# Patient Record
Sex: FEMALE | Race: ASIAN | NOT HISPANIC OR LATINO | URBAN - METROPOLITAN AREA
[De-identification: names, ages, dates, MRNs, and addresses within clinical notes are randomized per-mention and may not be internally consistent; named-entity substitution may affect disease eponyms.]

---

## 2022-06-27 ENCOUNTER — OFFICE VISIT (OUTPATIENT)
Dept: DENTISTRY | Facility: CLINIC | Age: 22
End: 2022-06-27

## 2022-06-27 VITALS — HEART RATE: 84 BPM | DIASTOLIC BLOOD PRESSURE: 77 MMHG | TEMPERATURE: 98.4 F | SYSTOLIC BLOOD PRESSURE: 115 MMHG

## 2022-06-27 DIAGNOSIS — Z01.20 ENCOUNTER FOR DENTAL EXAMINATION: Primary | ICD-10-CM

## 2022-06-27 PROCEDURE — D0120 PERIODIC ORAL EVALUATION - ESTABLISHED PATIENT: HCPCS | Performed by: DENTIST

## 2022-06-27 PROCEDURE — D0274 BITEWINGS - 4 RADIOGRAPHIC IMAGES: HCPCS

## 2022-06-27 PROCEDURE — D1110 PROPHYLAXIS - ADULT: HCPCS

## 2022-06-27 RX ORDER — IBUPROFEN 200 MG
200 TABLET ORAL EVERY 6 HOURS PRN
COMMUNITY

## 2022-06-27 RX ORDER — SODIUM FLUORIDE 5 MG/ML
PASTE, DENTIFRICE DENTAL DAILY
Qty: 51 G | Refills: 2 | Status: SHIPPED | OUTPATIENT
Start: 2022-06-27

## 2022-06-27 NOTE — PROGRESS NOTES
RECALL EXAM, ADULT PROPHY,  4 BWX    REVIEWED MED HX: meds, allergies, health changes reviewed in EPIC  CHIEF CONCERN:  none   PAIN SCALE:  0  ASA CLASS:  I  PLAQUE:  mild   CALCULUS:  llight calculus  BLEEDING:   none   STAIN :   None  ORAL HYGIENE:  Good  PERIO:   Its been 3yr since last cleaning ( Covid) reviewed OH and updated BW's  Hand scaled, polished and flossed  Patient had 3rd molars extracted   Dr Kelsey Carcamo prescribed prevident, and gave instructions     Oral Hygiene Instruction:  recommended brushing 2 x daily for 2 minutes MIN, recommended flossing daily, reviewed dietary precautions  Visual and Tactile Intraoral/ Extraoral evaluation: Oral and Oropharyngeal cancer evaluation  No findings     Dr Kelsey Carcamo  exam=   Reviewed with patient clinical and radiographic findings and patient verbalized understanding  All questions and concerns addressed       REFERRALS: no referrals needed    CARIES FINDINGS:  caries noted #12 DO, #13 MOD, #14 B, #18 B #19 B  #4 MO, #30 Occlusal Preventive resin restoration,    Watch #20 DO shadow on both bw films, #5 watch mesial     Next Visit: Restorative #12 DO, #13 MOD #14 B     Next Recall: 6 month recall     Last BWX: today  6/27/2022  Last Panorex:  6/5/2019

## 2022-10-05 ENCOUNTER — OFFICE VISIT (OUTPATIENT)
Dept: DENTISTRY | Facility: CLINIC | Age: 22
End: 2022-10-05

## 2022-10-05 VITALS — DIASTOLIC BLOOD PRESSURE: 75 MMHG | HEART RATE: 101 BPM | SYSTOLIC BLOOD PRESSURE: 109 MMHG | TEMPERATURE: 99.6 F

## 2022-10-05 DIAGNOSIS — K02.9 TOOTH DECAY: Primary | ICD-10-CM

## 2022-10-05 PROCEDURE — D2391 RESIN-BASED COMPOSITE - 1 SURFACE, POSTERIOR: HCPCS | Performed by: DENTIST

## 2022-10-05 PROCEDURE — D2392 RESIN-BASED COMPOSITE - 2 SURFACES, POSTERIOR: HCPCS | Performed by: DENTIST

## 2022-10-05 NOTE — PROGRESS NOTES
Composite Filling    Keshia Coppola presents for composite filling  PMH reviewed, no changes  ASA I    Discussed with patient need for RCT if pulp exposure occurs or in future if pulp is inflamed  Pt understands and consents  Applied topical benzocaine, administered 1 carps 2% lido 1:100k epi via IANB LR side    Prepped tooth # 30(OB), 31(O) with 245 carbide on high speed  Prep mod #30(O)  Deep, placed limelite, cured   Isolation with cotton rolls and dri-angles    Etch with 37% H2PO4, rinse, dry  Applied Adhese with 20 second scrub once, gentle air dry and light cured for 10s  Restored with Tetric bulk michelle shade A3 and light cured  Refined with finishing burs, polished with enhance point  Verified occlusion and contacts  POI given   Pt also adv desens  toothpaste incase there is sensitivity  Pt left comfortable and satisfied     nv: fills (UR side)

## 2023-01-09 ENCOUNTER — OFFICE VISIT (OUTPATIENT)
Dept: DENTISTRY | Facility: CLINIC | Age: 23
End: 2023-01-09

## 2023-01-09 VITALS — TEMPERATURE: 99.6 F

## 2023-01-09 DIAGNOSIS — Z01.20 ENCOUNTER FOR DENTAL EXAMINATION: Primary | ICD-10-CM

## 2023-03-31 ENCOUNTER — OFFICE VISIT (OUTPATIENT)
Dept: DENTISTRY | Facility: CLINIC | Age: 23
End: 2023-03-31

## 2023-03-31 VITALS — SYSTOLIC BLOOD PRESSURE: 112 MMHG | HEART RATE: 88 BPM | TEMPERATURE: 98.7 F | DIASTOLIC BLOOD PRESSURE: 76 MMHG

## 2023-03-31 DIAGNOSIS — K02.9 CARIES: Primary | ICD-10-CM

## 2023-03-31 NOTE — PROGRESS NOTES
"Composite Filling: #13MOD and 14-DO  Name pronounced \"Tat\"  Child Sea presents for #13MOD and #14-DO  composite filling  PMH reviewed, no changes  Applied topical benzocaine, administered 1 carps 2% lido 1:100k epi via local infiltration  Prepped tooth #13 MO and DO slot preps with 245 bur and did not connect preps  Clinical cavitation on 14-M was visualized  Prepped 14MO with 245 carbide on high speed  Caries removed with round carbide on slow speed  Placed tofflemire on #13 and palodent matrix on 14 with wedge in between  Etched #13MOD with 37% H2PO4, rinsed, dried  Applied Adhese with 20 second scrub once, gentle air dry and light cured for 10s  Restored #13 with Tetric bulk michelle shade A2 and light cured  Removed both [place]ire matrice  Refined #13 with finishing burs  Placed palodent matrix with wedge and garrisonon #14  Etched #14MO with 37% H2PO4, rinsed, dried  Applied Adhese with 20 second scrub once, gentle air dry and light cured for 10s  Restored #13 with Tetric bulk michelle shade A2 and light cured  Refined with finishing burs, polished with enhance point  Verified occlusion and contacts  Pt left satisfied        Nv: #3MO AND 4MOD  Nv: #12-DO   Nv: recall scheudled in July  "

## 2023-04-03 NOTE — DENTAL PROCEDURE DETAILS
"Composite Filling: #13MOD and 14-DO  Name pronounced \"Tat\"  MadeiraMadeira presents for #13MOD and #14-DO  composite filling  PMH reviewed, no changes  Applied topical benzocaine, administered 1 carps 2% lido 1:100k epi via local infiltration  Prepped tooth #13 MO and DO slot preps with 245 bur and did not connect preps  Clinical cavitation on 14-M was visualized  Prepped 14MO with 245 carbide on high speed  Caries removed with round carbide on slow speed  Placed tofflemire on #13 and palodent matrix on 14 with wedge in between  Etched #13MOD with 37% H2PO4, rinsed, dried  Applied Adhese with 20 second scrub once, gentle air dry and light cured for 10s  Restored #13 with Tetric bulk michelle shade A2 and light cured  Removed both [place]ff[place]ire matrice  Refined #13 with finishing burs  Placed palodent matrix with wedge and garrisonon #14  Etched #14MO with 37% H2PO4, rinsed, dried  Applied Adhese with 20 second scrub once, gentle air dry and light cured for 10s  Restored #13 with Tetric bulk michelle shade A2 and light cured  Refined with finishing burs, polished with enhance point  Verified occlusion and contacts  Pt left satisfied        Nv: #3MO AND 4MOD  Nv: #12-DO   Nv: recall scheudled in July  "

## 2023-07-24 ENCOUNTER — OFFICE VISIT (OUTPATIENT)
Dept: DENTISTRY | Facility: CLINIC | Age: 23
End: 2023-07-24

## 2023-07-24 DIAGNOSIS — Z01.20 ENCOUNTER FOR DENTAL EXAMINATION: Primary | ICD-10-CM

## 2023-07-24 PROCEDURE — D1110 PROPHYLAXIS - ADULT: HCPCS

## 2023-07-24 PROCEDURE — D0274 BITEWINGS - 4 RADIOGRAPHIC IMAGES: HCPCS

## 2023-07-24 PROCEDURE — D0120 PERIODIC ORAL EVALUATION - ESTABLISHED PATIENT: HCPCS | Performed by: DENTIST

## 2023-07-24 NOTE — DENTAL PROCEDURE DETAILS
Jonathan Marquis presents for a Periodic exam. Verbal consent for treatment given in addition to the forms. Reviewed health history - Patient is ASA I  Consents signed: Yes     Perio: Normal  Pain Scale: 0  Caries Assessment: Medium  Radiographs: Bitewings x4     Oral Hygiene instruction reviewed and given. Recommended Hygiene recall visits with the [facility]. Patient presents for [facility] no chief complaints today, pt is using prevident and try to floss. Treatment Plan:  1. Infection control:   2. Adult prophy   3. Caries control: as charted previous tx #3 MO , #4 MOD, #12 DO, #18 B #19 OB   4. Occlusal evaluation:   5. Case Difficulty Type 1  Prognosis is Good.   Referrals needed: No  Next Visit:  Restorative #3 MO & #4 MOD   NV: 2 6MRC no BW   Dr. Maryana Urbina

## 2023-08-04 ENCOUNTER — OFFICE VISIT (OUTPATIENT)
Dept: DENTISTRY | Facility: CLINIC | Age: 23
End: 2023-08-04

## 2023-08-04 VITALS — DIASTOLIC BLOOD PRESSURE: 73 MMHG | TEMPERATURE: 97.8 F | SYSTOLIC BLOOD PRESSURE: 104 MMHG | HEART RATE: 76 BPM

## 2023-08-04 DIAGNOSIS — K02.61 DENTAL CARIES ON SMOOTH SURFACE LIMITED TO ENAMEL: Primary | ICD-10-CM

## 2023-08-04 PROCEDURE — D2392 RESIN-BASED COMPOSITE - 2 SURFACES, POSTERIOR: HCPCS

## 2023-08-04 NOTE — PROGRESS NOTES
Tammy Galaviz presented for #3MO composite restoration. CC: I would like to have my cavities fixed. Pain: 0/10  Med Hx: updated and reviewed  OE: WNL IE: WNL  Radiographs: reviewed BWXR  Tx Details: Discussed with patient need for RCT if pulp exposure occurs or in future if pulp is inflamed. Pt understands and consents. Applied topical benzocaine, administered 2 carps 4% articaine 1:100k epi via maxillary infiltration. Prepped tooth #3MO with 330 carbide on high speed. Caries removed with round carbide on slow speed. Placed palodent matrix. Isolation with cotton rolls and dri-angles. Etch with 37% H2PO4, rinse, dry. Applied Adhese with 20 second scrub once, gentle air dry and light cured for 10s. Restored with Tetric bulk michelle shade A2 and light cured. Refined with finishing burs, polished with enhance point. Verified occlusion and contacts.  Pt left satisfied and ambulatory:   Post op: wait until anesthesia wears away to eat   NV: #4MOD evie

## 2024-02-26 ENCOUNTER — OFFICE VISIT (OUTPATIENT)
Dept: DENTISTRY | Facility: CLINIC | Age: 24
End: 2024-02-26

## 2024-02-26 VITALS — SYSTOLIC BLOOD PRESSURE: 106 MMHG | HEART RATE: 63 BPM | TEMPERATURE: 99.1 F | DIASTOLIC BLOOD PRESSURE: 74 MMHG

## 2024-02-26 DIAGNOSIS — Z01.20 ENCOUNTER FOR DENTAL EXAMINATION: Primary | ICD-10-CM

## 2024-02-26 PROCEDURE — D1110 PROPHYLAXIS - ADULT: HCPCS | Performed by: DENTAL HYGIENIST

## 2024-02-26 PROCEDURE — D0120 PERIODIC ORAL EVALUATION - ESTABLISHED PATIENT: HCPCS

## 2024-02-26 NOTE — DENTAL PROCEDURE DETAILS
Ramya RAMAN Wylie presents for a Periodic exam. Verbal consent for treatment given in addition to the forms.     Reviewed health history - Patient is ASA I  Consents signed: Yes     Perio: Slight bleeding and Gingivitis  Pain Scale: 0  Caries Assessment: Low  Radiographs: None  EO/IO/OCS:  WNL     Oral Hygiene instruction reviewed and given.  OHI:  Good  ---Lt calc and plaque  ---Cavitron, handscaled, polish, floss  Recommended Hygiene recall visits with Ramya.     Treatment Plan:  1.  6mrc w/ BWs   2.  Caries control:   4 - MOD, 12 - DO - 18 - B, 19 - OB  ---Watch areas as charted  3.  Occlusal evaluation:  Class I     Prognosis is Good.  Referrals needed: No  Exam:  Dr. Garcia    NV1:  Rest 4- MOD - 60 min  NV2:   6mrc w/ Bws - 50 min

## 2024-03-08 ENCOUNTER — OFFICE VISIT (OUTPATIENT)
Dept: DENTISTRY | Facility: CLINIC | Age: 24
End: 2024-03-08

## 2024-03-08 VITALS — DIASTOLIC BLOOD PRESSURE: 78 MMHG | TEMPERATURE: 97.8 F | HEART RATE: 86 BPM | SYSTOLIC BLOOD PRESSURE: 122 MMHG

## 2024-03-08 DIAGNOSIS — K02.9 TOOTH DECAY: Primary | ICD-10-CM

## 2024-03-08 PROCEDURE — D2392 RESIN-BASED COMPOSITE - 2 SURFACES, POSTERIOR: HCPCS

## 2024-03-08 PROCEDURE — D2393 RESIN-BASED COMPOSITE - 3 SURFACES, POSTERIOR: HCPCS

## 2024-03-08 NOTE — PROGRESS NOTES
Composite Filling    Ramya Wylie presents for composite filling. PMH reviewed, no changes. ASA1    Discussed with patient need for RCT if pulp exposure occurs or in future if pulp is inflamed. Pt understands and consents.    Applied topical benzocaine, administered 1 carps 4% articaine 1:100k epi via maxillary infiltraiton    Prepped tooth #4MOD and #5DO with 245 carbide on high speed. Caries removed with round carbide on slow speed. Placed palodent matrix. Isolation with cotton rolls and dri-angles    Etch with 37% H2PO4, rinse, dry. Applied Adhese with 20 second scrub once, gentle air dry and light cured for 10s. Restored with Tetric bulk michelle shade A2 and light cured.    Refined with finishing burs, polished with enhance point. Verified occlusion and contacts. Pt left satisfied.    NV: #18B and #19OB evie    Attending: Dr. Castellanos

## 2024-04-08 ENCOUNTER — OFFICE VISIT (OUTPATIENT)
Dept: DENTISTRY | Facility: CLINIC | Age: 24
End: 2024-04-08

## 2024-04-08 VITALS — SYSTOLIC BLOOD PRESSURE: 115 MMHG | TEMPERATURE: 98.2 F | DIASTOLIC BLOOD PRESSURE: 76 MMHG | HEART RATE: 71 BPM

## 2024-04-08 DIAGNOSIS — K02.9 CARIES LESION, FACIAL SURFACE, MODERATE, ACTIVE: Primary | ICD-10-CM

## 2024-04-08 PROCEDURE — D2391 RESIN-BASED COMPOSITE - 1 SURFACE, POSTERIOR: HCPCS | Performed by: DENTIST

## 2024-04-08 NOTE — DENTAL PROCEDURE DETAILS
"Composite Filling #18 B    Ramya Wylie presents for composite filling.   Patient consent treatment.   PMH reviewed, no changes.  ASA: I  Pain scale: 0  Chief complain: \"Need fillings\".  Radiograph: current.  Diagnosis: Tooth #18 buccal pit caries.   Discussed with patient need for RCT if pulp exposure occurs or in future if pulp is inflamed. Pt understands and consents.  Applied topical benzocaine, administered half one carps 2% lido 1:100k epi via mandibular infiltration.   Prepped tooth #18 buccal pit with 330 carbide on high speed. Caries removed with round carbide on slow speed. Isolation with cotton rolls and dri-angles  Etch with 37% H2PO4, rinse, dry. Applied Adhese with 20 second scrub once, gentle air dry and light cured for 10s. Restored with Tetric bulk michelle shade A2 and light cured.  Refined with finishing burs, polished with enhance point. Verified occlusion and contacts. POI is given.  Reviewed oral hygiene and need for recall visits.   Pt left satisfied and ambulatory.  NV: Continue restorative care.   "

## 2024-05-03 ENCOUNTER — OFFICE VISIT (OUTPATIENT)
Dept: DENTISTRY | Facility: CLINIC | Age: 24
End: 2024-05-03

## 2024-05-03 VITALS — DIASTOLIC BLOOD PRESSURE: 79 MMHG | TEMPERATURE: 98.6 F | HEART RATE: 69 BPM | SYSTOLIC BLOOD PRESSURE: 121 MMHG

## 2024-05-03 DIAGNOSIS — K08.50 SECONDARY DENTAL CARIES ASSOCIATED WITH FAILED OR DEFECTIVE DENTAL RESTORATION: Primary | ICD-10-CM

## 2024-05-03 DIAGNOSIS — K02.9 SECONDARY DENTAL CARIES ASSOCIATED WITH FAILED OR DEFECTIVE DENTAL RESTORATION: Primary | ICD-10-CM

## 2024-05-03 PROCEDURE — D2392 RESIN-BASED COMPOSITE - 2 SURFACES, POSTERIOR: HCPCS | Performed by: DENTIST

## 2024-05-03 NOTE — DENTAL PROCEDURE DETAILS
"Composite Filling #19 OB     Ramya Wylie presents for composite filling.   Patient consent treatment.   PMH reviewed, no changes.  ASA: I  Pain scale: 0  Chief complain: \"Need fillings\".  Radiograph: current.  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   Discussed with patient need for RCT if pulp exposure occurs or in future if pulp is inflamed. Pt understands and consents.  Applied topical benzocaine, administered one carps 2% lido 1:100k epi via mandibular infiltration.   Prepped tooth #19 OB with 245 toribio on high speed. Caries removed with round carbide on slow speed. Isolation with cotton rolls and dri-angles  Etch with 37% H2PO4, rinse, dry. Applied Adhese with 20 second scrub once, gentle air dry and light cured for 10s. Restored with Tetric bulk michelle shade A2 and light cured.  Refined with finishing burs, polished with enhance point. Verified occlusion and contacts. POI is given.  Reviewed oral hygiene and need for recall visits.   Pt left satisfied and ambulatory.  NV: Continue restorative care.   "

## 2024-05-13 ENCOUNTER — OFFICE VISIT (OUTPATIENT)
Dept: DENTISTRY | Facility: CLINIC | Age: 24
End: 2024-05-13

## 2024-05-13 VITALS — HEART RATE: 71 BPM | SYSTOLIC BLOOD PRESSURE: 112 MMHG | DIASTOLIC BLOOD PRESSURE: 76 MMHG | TEMPERATURE: 98.6 F

## 2024-05-13 DIAGNOSIS — K02.9 CARIES INVOLVING MULTIPLE SURFACES OF TOOTH: Primary | ICD-10-CM

## 2024-05-13 PROCEDURE — D2392 RESIN-BASED COMPOSITE - 2 SURFACES, POSTERIOR: HCPCS | Performed by: DENTIST

## 2024-05-13 NOTE — DENTAL PROCEDURE DETAILS
"Composite Filling #12 DO     Ramya Wylie presents for composite filling.   Patient consent treatment.   PMH reviewed, no changes.  ASA: I  Pain scale: 0  Chief complain: \"Need filling\".  Radiograph: current.  Diagnosis: Tooth #12 DO caries.   Discussed with patient need for RCT if pulp exposure occurs or in future if pulp is inflamed. Pt understands and consents.  Applied topical benzocaine, administered half one carps 2% lido 1:100k epi via maxillary infiltration.   Prepped tooth #12 DO with 245 toribio on high speed. Caries removed with round carbide on slow speed. Isolation with cotton rolls and dri-angles. Placed [place] matrix.   Etch with 37% H2PO4, rinse, dry. Applied Adhese with 20 second scrub once, gentle air dry and light cured for 10s. Restored with Tetric bulk michelle shade A2 and light cured.  Refined with finishing burs, polished with enhance point. Verified occlusion and contacts. POI is given.  Reviewed oral hygiene and need for recall visits.   Pt left satisfied and ambulatory.  NV: Recall.   "

## 2024-08-30 ENCOUNTER — OFFICE VISIT (OUTPATIENT)
Dept: DENTISTRY | Facility: CLINIC | Age: 24
End: 2024-08-30

## 2024-08-30 VITALS — TEMPERATURE: 99 F | DIASTOLIC BLOOD PRESSURE: 74 MMHG | HEART RATE: 77 BPM | SYSTOLIC BLOOD PRESSURE: 109 MMHG

## 2024-08-30 DIAGNOSIS — Z01.20 ENCOUNTER FOR DENTAL EXAMINATION: Primary | ICD-10-CM

## 2024-08-30 PROCEDURE — D0120 PERIODIC ORAL EVALUATION - ESTABLISHED PATIENT: HCPCS

## 2024-08-30 PROCEDURE — D1110 PROPHYLAXIS - ADULT: HCPCS | Performed by: DENTAL HYGIENIST

## 2024-08-30 PROCEDURE — D0274 BITEWINGS - 4 RADIOGRAPHIC IMAGES: HCPCS | Performed by: DENTAL HYGIENIST

## 2024-08-30 NOTE — DENTAL PROCEDURE DETAILS
PERIODIC EXAM, ADULT PROPHY , 4 BWX   REVIEWED MED HX: meds, allergies, health changes reviewed in [facility]. All consents signed.  CHIEF CONCERN: none  PAIN SCALE:  0  ASA CLASS:  ASA 1 - Normal health patient  PLAQUE:  mild  CALCULUS: Light  BLEEDING:  none  STAIN : None     PERIO:  Healthy gingiva    Hygiene Procedures:  Scaled, Polished, Flossed    Oral Hygiene Instruction: Brushing minimum 2x daily for 2 minutes, daily flossing, Listerine, and Recommended soft toothbrush only    Dispensed: Toothbrush, Toothpaste, Floss, and RX for Prevident 5000 sent online to pt's pharmacy    Visual and Tactile Intraoral/ Extraoral evaluation: Oral and Oropharyngeal cancer evaluation. No findings     Dr. Pradhan Reviewed with patient clinical and radiographic findings and patient verbalized understanding. All questions and concerns addressed.     REFERRALS: None    CARIES FINDINGS:   Watch areas as charted       TREATMENT  PLAN :   NV1:  6mrc - 50 min    Last BWX:   8/30/24  Last Panorex -  8/6/19    / FMX :

## 2025-03-07 ENCOUNTER — OFFICE VISIT (OUTPATIENT)
Dept: DENTISTRY | Facility: CLINIC | Age: 25
End: 2025-03-07

## 2025-03-07 VITALS — HEART RATE: 73 BPM | TEMPERATURE: 99.9 F | DIASTOLIC BLOOD PRESSURE: 78 MMHG | SYSTOLIC BLOOD PRESSURE: 112 MMHG

## 2025-03-07 DIAGNOSIS — Z01.21 ENCOUNTER FOR DENTAL EXAMINATION AND CLEANING WITH ABNORMAL FINDINGS: Primary | ICD-10-CM

## 2025-03-07 PROCEDURE — D0120 PERIODIC ORAL EVALUATION - ESTABLISHED PATIENT: HCPCS

## 2025-03-07 PROCEDURE — D1110 PROPHYLAXIS - ADULT: HCPCS | Performed by: DENTAL HYGIENIST

## 2025-03-07 NOTE — PROGRESS NOTES
PERIODIC EXAM, ADULT PROPHY , (no xrays due )   REVIEWED MED HX: meds, allergies, health changes reviewed in [facility]. All consents signed.  CHIEF CONCERN: no dental pain or concerns  PAIN SCALE:  0  ASA CLASS:  I  PLAQUE:  mild  CALCULUS:  light  BLEEDING:   none  STAIN :   none      PERIO:  Healthy gingiva    Hygiene Procedures:  Scaled, Polished, Flossed    Oral Hygiene Instruction: Brushing Minimum 2x daily for 2 minutes, daily flossing, Listerine, and Recommended soft toothbrush only    Dispensed: Toothbrush, Toothpaste, Floss    Visual and Tactile Intraoral/ Extraoral evaluation: Oral and Oropharyngeal cancer evaluation. No findings     Dr. Pradhan   Reviewed with patient clinical and radiographic findings and patient verbalized understanding. All questions and concerns addressed.     REFERRALS: none    CARIES FINDINGS: see tooth chart       TREATMENT  PLAN :   NV1:  6mrc w/ Bws - 50 min    Last BWX:   8/30/24  Last Panorex:  8/6/19  Last  FMX :